# Patient Record
Sex: FEMALE | Race: WHITE | Employment: FULL TIME | URBAN - METROPOLITAN AREA
[De-identification: names, ages, dates, MRNs, and addresses within clinical notes are randomized per-mention and may not be internally consistent; named-entity substitution may affect disease eponyms.]

---

## 2021-06-15 ENCOUNTER — HOSPITAL ENCOUNTER (OUTPATIENT)
Age: 26
Discharge: HOME | End: 2021-06-15

## 2021-06-15 LAB — RUBV IGG SER QL: 213 IU/ML

## 2021-06-15 PROCEDURE — 86735 MUMPS ANTIBODY: CPT

## 2021-06-15 PROCEDURE — 36415 COLL VENOUS BLD VENIPUNCTURE: CPT

## 2021-06-15 PROCEDURE — 86765 RUBEOLA ANTIBODY: CPT

## 2021-06-15 PROCEDURE — 86481 TB AG RESPONSE T-CELL SUSP: CPT

## 2021-06-15 PROCEDURE — 86762 RUBELLA ANTIBODY: CPT

## 2021-06-16 LAB
MEASLES ANTIBODY IGG: 2.82
MUV IGG SER QL: 2.1

## 2021-06-18 LAB — T-SPOT TB TEST: NORMAL

## 2021-11-01 ENCOUNTER — TELEPHONE (OUTPATIENT)
Dept: FAMILY MEDICINE CLINIC | Age: 26
End: 2021-11-01

## 2021-11-01 NOTE — TELEPHONE ENCOUNTER
I have never seen this pt and she has never established care with me. I am no longer taking new pt's and she is going to have to get established with a new PCP and be seen at an urgent care for her acute problem.

## 2021-11-01 NOTE — TELEPHONE ENCOUNTER
This patient has never established care in our office. Was put as a Dr. Tyler Strong patient by someone in [facility] ([facility] I did try to reach her to discuss this and phone went straight to voicemail.

## 2021-11-01 NOTE — TELEPHONE ENCOUNTER
I did take Dr Tyler Strong out of the patients chart since she never established as a new patient with her. The patient has not returned my call from earlier this afternoon. I did also inform our  DIMITRIS Maria and explained the situation. He directed me to remove Dr. Tyler Strong from the chart.

## 2021-11-01 NOTE — TELEPHONE ENCOUNTER
----- Message from Akilah Coelho sent at 11/1/2021  2:36 PM EDT -----  Subject: Appointment Request    Reason for Call: Urgent Mental Health    QUESTIONS  Type of Appointment? Established Patient  Reason for appointment request? No appointments available during search  Additional Information for Provider? believes she has anxiety and is   suffering from anxiety or panic attacks, screened green, has open   availability   ---------------------------------------------------------------------------  --------------  CALL BACK INFO  What is the best way for the office to contact you? OK to leave message on   voicemail  Preferred Call Back Phone Number? [phone]  ---------------------------------------------------------------------------  --------------  SCRIPT ANSWERS  Relationship to Patient? Self  Are you having thoughts of hurting yourself or others? No  Are you seeing or hearing things that may not be real (present)? No  Do you think other people are trying to hurt or target you? No  Are you feeling sick because you have not used drugs or alcohol recently? No  Are you feeling sick because of using drugs or alcohol recently? No  Are you having depressed feelings? No  Are you suffering from anxiety or panic attacks? Yes  Have you been diagnosed with, awaiting test results for, or told that you   are suspected of having COVID-19 (Coronavirus)? (If patient has tested   negative or was tested as a requirement for work, school, or travel and   not based on symptoms, answer no)? No  Within the past two weeks have you developed any of the following symptoms   (answer no if symptoms have been present longer than 2 weeks or began   more than 2 weeks ago)?  Fever or Chills, Cough, Shortness of breath or   difficulty breathing, Loss of taste or smell, Sore throat, Nasal   congestion, Sneezing or runny nose, Fatigue or generalized body aches   (answer no if pain is specific to a body part e.g. back pain), Diarrhea, Headache? No  Have you had close contact with someone with COVID-19 in the last 14 days? No  (Service Expert  click yes below to proceed with [employer] As Usual   Scheduling)?  Yes